# Patient Record
Sex: MALE | Race: WHITE | NOT HISPANIC OR LATINO | Employment: UNEMPLOYED | URBAN - METROPOLITAN AREA
[De-identification: names, ages, dates, MRNs, and addresses within clinical notes are randomized per-mention and may not be internally consistent; named-entity substitution may affect disease eponyms.]

---

## 2019-08-13 ENCOUNTER — HISTORICAL (OUTPATIENT)
Dept: ADMINISTRATIVE | Facility: HOSPITAL | Age: 59
End: 2019-08-13

## 2019-08-16 LAB — FINAL CULTURE: NORMAL

## 2021-08-09 ENCOUNTER — HISTORICAL (OUTPATIENT)
Dept: ADMINISTRATIVE | Facility: HOSPITAL | Age: 61
End: 2021-08-09

## 2021-11-04 ENCOUNTER — HISTORICAL (OUTPATIENT)
Dept: SURGERY | Facility: HOSPITAL | Age: 61
End: 2021-11-04

## 2022-04-10 ENCOUNTER — HISTORICAL (OUTPATIENT)
Dept: ADMINISTRATIVE | Facility: HOSPITAL | Age: 62
End: 2022-04-10
Payer: COMMERCIAL

## 2022-04-27 VITALS
BODY MASS INDEX: 29.37 KG/M2 | DIASTOLIC BLOOD PRESSURE: 76 MMHG | SYSTOLIC BLOOD PRESSURE: 133 MMHG | HEIGHT: 68 IN | WEIGHT: 193.81 LBS

## 2022-04-30 NOTE — OP NOTE
Patient:   Sang Magallanes            MRN: [id number]            FIN: [id number]               Age:   61 years     Sex:  Male     :  1960   Associated Diagnoses:   None   Author:   Mark De Los Santos MD, Cosmo SCOTT      SURGEON: Cosmo Flores MD    PREOPERATIVE DIAGNOSIS: Right shoulder rotator cuff tear, biceps tendinitis  POSTOPERATIVE DIAGNOSIS: Right shoulder rotator cuff tear, biceps tendinitis    PROCEDURE PERFORMED:   1. Right shoulder arthroscopic rotator cuff repair (subscapularis)  2. Right shoulder arthroscopic biceps tenodesis    ASSISTANT: Keisha Dietrich NP. Mrs. Dietrich was essential in manipulating the arm I performed the procedure, suture shuttling and management, and closure    ANESTHESIA: General plus regional    ESTIMATED BLOOD LOSS: Minimal.    COMPLICATIONS: None.    IMPLANTS:    1. Medial Row: Arthrex SwiveLock 4.75mm Anchors   2. Biceps: Arthrex SwiveLock 4.75mm Anchors    INDICATIONS FOR PROCEDURE: Sang is a 61y.o. male who has had ongoing right shoulder pain and weakness. The patient was seen in the clinic and preoperative imaging has revealed a torn rotator cuff. The patient has failed nonoperative treatment and has elected for operative intervention. Risks and benefits were thoroughly explained and consent was obtained prior to today's procedure.    DESCRIPTION OF PROCEDURE: The patient was seen in the preoperative holding area where the history and physical were reviewed without change. The operative shoulder was marked, consents were reviewed, and any questions were answered for the patient. A regional blockade of the shoulder was performed by the Anesthesia Service. The patient was induced under general anesthesia. Next the patient was placed upright into the beachchair position with care taken to ensure the cervical spine was well positioned and the face, eyes and all bony prominences well protected. Preoperative time-out led by the surgeon was performed verifying the patient, procedure,  and preoperative antibiotics. The right upper extremity was then prepped and draped in the usual sterile fashion and secured to an arm hernandes.    A standard posterior portal was established with a #11-blade.  The arthroscope was inserted into the glenohumeral joint atraumatically. The joint was insufflated with arthroscopic fluid. We then made a standard anterior portal using an #18-gauge spinal needle for guidance. An arthroscopic probe was inserted through the anterior portal and diagnostic arthroscopy begun.    This revealed a inflamed biceps tendon. A intact superior labrum. An degenerative anterior, inferior and posterior labrum. The articular cartilage of the humeral head was intact. The articular cartilage of the glenoid was intact. The subscapularis tendon was torn off the superior edge. The articular side of the supraspinatus tendon was partially torn, less than 50%.  This was debrided with a shaver. The articular side of the infraspinatus tendon was intact.    I then used the shaver in order to debride the lesser tuberosity.  I then passed a stitch through the superior edge of the subscapularis and reduced it down to the tuberosity using a fiber tape and swivel lock anchor.  The fiber tape was cut flush.  I then used the retained sutures and passed these through the biceps tendon in a horizontal mattress fashion.  I then tied the biceps tendon down high in the groove after releasing it from its insertion on the superior labrum.    We identified the undersurface of the acromion. We used a VAPR to debride the undersurface of the acromion up to the anterior and lateral margins. We identified the CA ligament and reflected it off the acromion. We continued debridement of the bursal tissue, which was abundant.  I then inspected the bursal side of the rotator cuff and it was intact.    Once we completed this, we took the remaining final arthroscopic pictures. We then removed our instruments, closed the portals  with #3-0 monocryl suture. Sterile dressings were applied. The patient was then placed in an abduction pillow and sling, awoken from general anesthetic, and taken to recovery room in a stable condition.

## 2022-06-24 ENCOUNTER — OFFICE VISIT (OUTPATIENT)
Dept: ORTHOPEDICS | Facility: CLINIC | Age: 62
End: 2022-06-24
Payer: COMMERCIAL

## 2022-06-24 VITALS
BODY MASS INDEX: 29.55 KG/M2 | HEIGHT: 68 IN | SYSTOLIC BLOOD PRESSURE: 164 MMHG | HEART RATE: 63 BPM | DIASTOLIC BLOOD PRESSURE: 87 MMHG | WEIGHT: 195 LBS

## 2022-06-24 DIAGNOSIS — M65.312 TRIGGER FINGER OF LEFT THUMB: Primary | ICD-10-CM

## 2022-06-24 PROCEDURE — 1159F PR MEDICATION LIST DOCUMENTED IN MEDICAL RECORD: ICD-10-PCS | Mod: CPTII,,, | Performed by: ORTHOPAEDIC SURGERY

## 2022-06-24 PROCEDURE — 3077F SYST BP >= 140 MM HG: CPT | Mod: CPTII,,, | Performed by: ORTHOPAEDIC SURGERY

## 2022-06-24 PROCEDURE — 3008F BODY MASS INDEX DOCD: CPT | Mod: CPTII,,, | Performed by: ORTHOPAEDIC SURGERY

## 2022-06-24 PROCEDURE — 3008F PR BODY MASS INDEX (BMI) DOCUMENTED: ICD-10-PCS | Mod: CPTII,,, | Performed by: ORTHOPAEDIC SURGERY

## 2022-06-24 PROCEDURE — 3079F DIAST BP 80-89 MM HG: CPT | Mod: CPTII,,, | Performed by: ORTHOPAEDIC SURGERY

## 2022-06-24 PROCEDURE — 99213 OFFICE O/P EST LOW 20 MIN: CPT | Mod: ,,, | Performed by: ORTHOPAEDIC SURGERY

## 2022-06-24 PROCEDURE — 99213 PR OFFICE/OUTPT VISIT, EST, LEVL III, 20-29 MIN: ICD-10-PCS | Mod: ,,, | Performed by: ORTHOPAEDIC SURGERY

## 2022-06-24 PROCEDURE — 1159F MED LIST DOCD IN RCRD: CPT | Mod: CPTII,,, | Performed by: ORTHOPAEDIC SURGERY

## 2022-06-24 PROCEDURE — 3079F PR MOST RECENT DIASTOLIC BLOOD PRESSURE 80-89 MM HG: ICD-10-PCS | Mod: CPTII,,, | Performed by: ORTHOPAEDIC SURGERY

## 2022-06-24 PROCEDURE — 3077F PR MOST RECENT SYSTOLIC BLOOD PRESSURE >= 140 MM HG: ICD-10-PCS | Mod: CPTII,,, | Performed by: ORTHOPAEDIC SURGERY

## 2022-06-24 RX ORDER — CEFAZOLIN SODIUM 2 G/50ML
2 SOLUTION INTRAVENOUS
Status: CANCELLED | OUTPATIENT
Start: 2022-07-14

## 2022-06-24 RX ORDER — SODIUM CHLORIDE 9 MG/ML
INJECTION, SOLUTION INTRAVENOUS CONTINUOUS
Status: CANCELLED | OUTPATIENT
Start: 2022-07-14

## 2022-06-24 NOTE — H&P (VIEW-ONLY)
"Chief Complaint:   Chief Complaint   Patient presents with    Left Hand - Pain    Pain     Left thumb pain, had injection on 2/2 states it only helped for a month       Consulting Physician: No ref. provider found    History of present illness:    he  is a pleasant 61 y.o. year old male with left thumb pain since the Fall 2021 after hitting his thumb on a board. He has noted some continued triggering since then.  We tried an injection in February of 2022. The injection worked for about a month.  Unfortunately his triggering and pain have returned.      History reviewed. No pertinent past medical history.    Past Surgical History:   Procedure Laterality Date    FACIAL FRACTURE SURGERY      PROSTATE SURGERY      SHOULDER SURGERY      Right       No current outpatient medications on file.     No current facility-administered medications for this visit.       Review of patient's allergies indicates:   Allergen Reactions    Naproxen Rash       History reviewed. No pertinent family history.    Social History     Socioeconomic History    Marital status:    Tobacco Use    Smoking status: Never Smoker    Smokeless tobacco: Never Used       Review of Systems:    Constitution:   Denies chills, fever, and sweats.  HENT:   Denies headaches or blurry vision.  Cardiovascular:  Denies chest pain or irregular heart beat.  Respiratory:   Denies cough or shortness of breath.  Gastrointestinal:  Denies abdominal pain, nausea, or vomiting.  Musculoskeletal:   Denies muscle cramps.  Neurological:   Denies dizziness or focal weakness.  Psychiatric/Behavior: Normal mental status.  Hematology/Lymph:  Denies bleeding problem or easy bruising/bleeding.  Skin:    Denies rash or suspicious lesions.    Examination:    Vital Signs:    Vitals:    06/24/22 1023   BP: (!) 164/87   Pulse: 63   Weight: 88.5 kg (195 lb)   Height: 5' 8" (1.727 m)       Body mass index is 29.65 kg/m².    Constitution:   Well-developed, well nourished " patient in no acute distress.  Neurological:   Alert and oriented x 3 and cooperative to examination.     Psychiatric/Behavior: Normal mental status.  Respiratory:   No shortness of breath.  Eyes:    Extraoccular muscles intact  Skin:    No scars, rash or suspicious lesions.    MSK:   Exam over the thumb shows tenderness over his oblique pulley.  He has active triggering with flexion.           Assessment: Trigger finger of left thumb        Plan:  I have recommended surgical intervention:  Left thumb trigger release.  We discussed the details of the procedure and expected postoperative course.  We discussed the benefits of surgery which he did decrease his pain and increase function.  We discussed the risks of surgery which is small but could be significant if his continued pain, stiffness, or infection.  After discussion I to proceed.  Plans for surgery July 14.

## 2022-07-11 RX ORDER — ROSUVASTATIN CALCIUM 10 MG/1
10 TABLET, COATED ORAL DAILY
COMMUNITY
Start: 2022-03-11 | End: 2024-02-29

## 2022-07-11 RX ORDER — IBUPROFEN 200 MG
200 TABLET ORAL EVERY 6 HOURS PRN
COMMUNITY
End: 2024-02-29

## 2022-07-13 ENCOUNTER — ANESTHESIA EVENT (OUTPATIENT)
Dept: SURGERY | Facility: HOSPITAL | Age: 62
End: 2022-07-13
Payer: COMMERCIAL

## 2022-07-13 NOTE — ANESTHESIA PREPROCEDURE EVALUATION
07/13/2022  Sang Magallanes is a 61 y.o., male presents with a locking Thumb on his left hand due to:.  Diagnosis:   Trigger finger of left thumb        He comes to [facility] for the noted procedure under IV Sedation with Local  Per Surgeon.  Procedure:   RELEASE, TRIGGER FINGER (Left Hand)      PMHx:  Arthritis Hypercholesteremia       Surgical History  SHOULDER SURGERY FACIAL FRACTURE SURGERY   PROSTATE SURGERY            Vital signs:        Pre-op Assessment    I have reviewed the Patient Summary Reports.     I have reviewed the Nursing Notes. I have reviewed the NPO Status.   I have reviewed the Medications.     Review of Systems  Anesthesia Hx:  No problems with previous Anesthesia    Social:  Non-Smoker    Hematology/Oncology:  Hematology Normal   Oncology Normal     EENT/Dental:EENT/Dental Normal   Cardiovascular:  Cardiovascular Normal Exercise tolerance: good   Functional Capacity good / => 4 METS    Pulmonary:  Pulmonary Normal    Renal/:  Renal/ Normal     Hepatic/GI:  Hepatic/GI Normal    Musculoskeletal:  Musculoskeletal Normal    Neurological:  Neurology Normal    Endocrine:  Endocrine Normal    Dermatological:  Skin Normal    Psych:  Psychiatric Normal           Physical Exam  General: Alert, Oriented, Well nourished and Cooperative    Airway:  Mallampati: II   Mouth Opening: Normal  TM Distance: Normal  Tongue: Normal  Neck ROM: Normal ROM    Dental:  Intact    Chest/Lungs:  Clear to auscultation, Normal Respiratory Rate    Heart:  Rate: Normal  Rhythm: Regular Rhythm        Anesthesia Plan  Type of Anesthesia, risks & benefits discussed:    Anesthesia Type: MAC, Gen Natural Airway  Intra-op Monitoring Plan: Standard ASA Monitors  Post Op Pain Control Plan: IV/PO Opioids PRN  Induction:  IV  ASA Score: 1  Day of Surgery Review of History & Physical: H&P Update referred to the  surgeon/provider.    Ready For Surgery From Anesthesia Perspective.     .

## 2022-07-14 ENCOUNTER — HOSPITAL ENCOUNTER (OUTPATIENT)
Facility: HOSPITAL | Age: 62
Discharge: HOME | End: 2022-07-14
Attending: ORTHOPAEDIC SURGERY | Admitting: ORTHOPAEDIC SURGERY
Payer: COMMERCIAL

## 2022-07-14 ENCOUNTER — ANESTHESIA (OUTPATIENT)
Dept: SURGERY | Facility: HOSPITAL | Age: 62
End: 2022-07-14
Payer: COMMERCIAL

## 2022-07-14 DIAGNOSIS — M65.312 TRIGGER FINGER OF LEFT THUMB: Primary | ICD-10-CM

## 2022-07-14 PROCEDURE — 25000003 PHARM REV CODE 250: Performed by: ORTHOPAEDIC SURGERY

## 2022-07-14 PROCEDURE — 25000003 PHARM REV CODE 250: Performed by: NURSE ANESTHETIST, CERTIFIED REGISTERED

## 2022-07-14 PROCEDURE — 71000015 HC POSTOP RECOV 1ST HR: Performed by: ORTHOPAEDIC SURGERY

## 2022-07-14 PROCEDURE — 71000016 HC POSTOP RECOV ADDL HR: Performed by: ORTHOPAEDIC SURGERY

## 2022-07-14 PROCEDURE — 37000008 HC ANESTHESIA 1ST 15 MINUTES: Performed by: ORTHOPAEDIC SURGERY

## 2022-07-14 PROCEDURE — 63600175 PHARM REV CODE 636 W HCPCS: Performed by: NURSE ANESTHETIST, CERTIFIED REGISTERED

## 2022-07-14 PROCEDURE — 25000003 PHARM REV CODE 250: Performed by: ANESTHESIOLOGY

## 2022-07-14 PROCEDURE — 26055 PR INCISE FINGER TENDON SHEATH: ICD-10-PCS | Mod: FA,,, | Performed by: ORTHOPAEDIC SURGERY

## 2022-07-14 PROCEDURE — 36000707: Performed by: ORTHOPAEDIC SURGERY

## 2022-07-14 PROCEDURE — 26055 INCISE FINGER TENDON SHEATH: CPT | Mod: FA,,, | Performed by: ORTHOPAEDIC SURGERY

## 2022-07-14 PROCEDURE — 37000009 HC ANESTHESIA EA ADD 15 MINS: Performed by: ORTHOPAEDIC SURGERY

## 2022-07-14 PROCEDURE — 36000706: Performed by: ORTHOPAEDIC SURGERY

## 2022-07-14 RX ORDER — LIDOCAINE HYDROCHLORIDE 20 MG/ML
INJECTION, SOLUTION EPIDURAL; INFILTRATION; INTRACAUDAL; PERINEURAL
Status: DISCONTINUED | OUTPATIENT
Start: 2022-07-14 | End: 2022-07-14

## 2022-07-14 RX ORDER — CEFAZOLIN SODIUM 2 G/50ML
2 SOLUTION INTRAVENOUS
Status: DISCONTINUED | OUTPATIENT
Start: 2022-07-14 | End: 2022-07-14 | Stop reason: HOSPADM

## 2022-07-14 RX ORDER — TRAMADOL HYDROCHLORIDE 50 MG/1
50 TABLET ORAL EVERY 4 HOURS PRN
Status: DISCONTINUED | OUTPATIENT
Start: 2022-07-14 | End: 2022-07-14 | Stop reason: HOSPADM

## 2022-07-14 RX ORDER — MIDAZOLAM HYDROCHLORIDE 1 MG/ML
INJECTION INTRAMUSCULAR; INTRAVENOUS
Status: DISCONTINUED | OUTPATIENT
Start: 2022-07-14 | End: 2022-07-14

## 2022-07-14 RX ORDER — PROPOFOL 10 MG/ML
VIAL (ML) INTRAVENOUS CONTINUOUS PRN
Status: DISCONTINUED | OUTPATIENT
Start: 2022-07-14 | End: 2022-07-14

## 2022-07-14 RX ORDER — GLYCOPYRROLATE 0.2 MG/ML
INJECTION INTRAMUSCULAR; INTRAVENOUS
Status: DISCONTINUED | OUTPATIENT
Start: 2022-07-14 | End: 2022-07-14

## 2022-07-14 RX ORDER — LIDOCAINE HYDROCHLORIDE 10 MG/ML
INJECTION INFILTRATION; PERINEURAL
Status: DISCONTINUED
Start: 2022-07-14 | End: 2022-07-14 | Stop reason: HOSPADM

## 2022-07-14 RX ORDER — MORPHINE SULFATE 4 MG/ML
3 INJECTION, SOLUTION INTRAMUSCULAR; INTRAVENOUS
Status: DISCONTINUED | OUTPATIENT
Start: 2022-07-14 | End: 2022-07-14 | Stop reason: HOSPADM

## 2022-07-14 RX ORDER — DEXAMETHASONE SODIUM PHOSPHATE 4 MG/ML
INJECTION, SOLUTION INTRA-ARTICULAR; INTRALESIONAL; INTRAMUSCULAR; INTRAVENOUS; SOFT TISSUE
Status: DISCONTINUED | OUTPATIENT
Start: 2022-07-14 | End: 2022-07-14

## 2022-07-14 RX ORDER — LIDOCAINE HYDROCHLORIDE 10 MG/ML
1 INJECTION, SOLUTION EPIDURAL; INFILTRATION; INTRACAUDAL; PERINEURAL ONCE
Status: DISCONTINUED | OUTPATIENT
Start: 2022-07-14 | End: 2022-07-14 | Stop reason: HOSPADM

## 2022-07-14 RX ORDER — CEFAZOLIN SODIUM 1 G/3ML
INJECTION, POWDER, FOR SOLUTION INTRAMUSCULAR; INTRAVENOUS
Status: DISCONTINUED | OUTPATIENT
Start: 2022-07-14 | End: 2022-07-14

## 2022-07-14 RX ORDER — ONDANSETRON 2 MG/ML
4 INJECTION INTRAMUSCULAR; INTRAVENOUS EVERY 12 HOURS PRN
Status: DISCONTINUED | OUTPATIENT
Start: 2022-07-14 | End: 2022-07-14 | Stop reason: HOSPADM

## 2022-07-14 RX ORDER — ONDANSETRON 2 MG/ML
INJECTION INTRAMUSCULAR; INTRAVENOUS
Status: DISCONTINUED | OUTPATIENT
Start: 2022-07-14 | End: 2022-07-14

## 2022-07-14 RX ORDER — SODIUM CHLORIDE 0.9 % (FLUSH) 0.9 %
3 SYRINGE (ML) INJECTION EVERY 6 HOURS PRN
Status: DISCONTINUED | OUTPATIENT
Start: 2022-07-14 | End: 2022-07-14 | Stop reason: HOSPADM

## 2022-07-14 RX ORDER — SODIUM CHLORIDE, SODIUM GLUCONATE, SODIUM ACETATE, POTASSIUM CHLORIDE AND MAGNESIUM CHLORIDE 30; 37; 368; 526; 502 MG/100ML; MG/100ML; MG/100ML; MG/100ML; MG/100ML
1000 INJECTION, SOLUTION INTRAVENOUS CONTINUOUS
Status: DISCONTINUED | OUTPATIENT
Start: 2022-07-14 | End: 2022-07-14 | Stop reason: HOSPADM

## 2022-07-14 RX ORDER — LIDOCAINE HYDROCHLORIDE 10 MG/ML
INJECTION INFILTRATION; PERINEURAL
Status: DISCONTINUED | OUTPATIENT
Start: 2022-07-14 | End: 2022-07-14 | Stop reason: HOSPADM

## 2022-07-14 RX ORDER — SODIUM CHLORIDE 9 MG/ML
INJECTION, SOLUTION INTRAVENOUS CONTINUOUS
Status: DISCONTINUED | OUTPATIENT
Start: 2022-07-14 | End: 2022-07-14 | Stop reason: HOSPADM

## 2022-07-14 RX ORDER — HYDROCODONE BITARTRATE AND ACETAMINOPHEN 5; 325 MG/1; MG/1
1 TABLET ORAL EVERY 4 HOURS PRN
Status: DISCONTINUED | OUTPATIENT
Start: 2022-07-14 | End: 2022-07-14 | Stop reason: HOSPADM

## 2022-07-14 RX ORDER — FENTANYL CITRATE 50 UG/ML
INJECTION, SOLUTION INTRAMUSCULAR; INTRAVENOUS
Status: DISCONTINUED | OUTPATIENT
Start: 2022-07-14 | End: 2022-07-14

## 2022-07-14 RX ADMIN — DEXAMETHASONE SODIUM PHOSPHATE 4 MG: 4 INJECTION, SOLUTION INTRA-ARTICULAR; INTRALESIONAL; INTRAMUSCULAR; INTRAVENOUS; SOFT TISSUE at 07:07

## 2022-07-14 RX ADMIN — MIDAZOLAM 2 MG: 1 INJECTION INTRAMUSCULAR; INTRAVENOUS at 06:07

## 2022-07-14 RX ADMIN — SODIUM CHLORIDE, SODIUM GLUCONATE, SODIUM ACETATE, POTASSIUM CHLORIDE AND MAGNESIUM CHLORIDE: 526; 502; 368; 37; 30 INJECTION, SOLUTION INTRAVENOUS at 06:07

## 2022-07-14 RX ADMIN — FENTANYL CITRATE 100 MCG: 50 INJECTION, SOLUTION INTRAMUSCULAR; INTRAVENOUS at 06:07

## 2022-07-14 RX ADMIN — ONDANSETRON HYDROCHLORIDE 4 MG: 2 SOLUTION INTRAMUSCULAR; INTRAVENOUS at 07:07

## 2022-07-14 RX ADMIN — LIDOCAINE HYDROCHLORIDE 50 MG: 20 INJECTION, SOLUTION EPIDURAL; INFILTRATION; INTRACAUDAL; PERINEURAL at 06:07

## 2022-07-14 RX ADMIN — PROPOFOL 65 MCG/KG/MIN: 10 INJECTION, EMULSION INTRAVENOUS at 06:07

## 2022-07-14 RX ADMIN — GLYCOPYRROLATE 0.1 MG: 0.2 INJECTION INTRAMUSCULAR; INTRAVENOUS at 06:07

## 2022-07-14 RX ADMIN — SODIUM CHLORIDE, SODIUM GLUCONATE, SODIUM ACETATE, POTASSIUM CHLORIDE AND MAGNESIUM CHLORIDE 1000 ML: 526; 502; 368; 37; 30 INJECTION, SOLUTION INTRAVENOUS at 06:07

## 2022-07-14 RX ADMIN — CEFAZOLIN 2 G: 330 INJECTION, POWDER, FOR SOLUTION INTRAMUSCULAR; INTRAVENOUS at 06:07

## 2022-07-14 NOTE — TRANSFER OF CARE
"Anesthesia Transfer of Care Note    Patient: Sang Magallanes    Procedure(s) Performed: Procedure(s) (LRB):  RELEASE, TRIGGER FINGER (Left)    Patient location: OPS    Anesthesia Type: MAC    Transport from OR: Transported from OR on room air with adequate spontaneous ventilation    Post pain: adequate analgesia    Post assessment: no apparent anesthetic complications    Post vital signs: stable    Level of consciousness: awake    Nausea/Vomiting: no nausea/vomiting    Complications: none    Transfer of care protocol was followed      Last vitals:   Visit Vitals  /81   Pulse (!) 51   Temp 35.6 °C (96.1 °F) (Tympanic)   Resp 16   Ht 5' 8" (1.727 m)   Wt 88.9 kg (195 lb 15.8 oz)   SpO2 97%   BMI 29.80 kg/m²     "

## 2022-07-14 NOTE — OP NOTE
Date of Service: 07/14/2022    Preoperative diagnosis: Left trigger thumb    Postoperative diagnosis: Left trigger thumb    Attending surgeon: Cosmo Flores MD    Assistant: Keisha Dietrich, nurse practitioner.  Ms. Dietrich was essential manipulating the thumb, retraction of the nerve, and closure    Procedure: Left Trigger thumb release     Anesthesia: Local plus monitored care    Estimated blood loss: Minimal    Tourniquet time:  15 Minutes    Complications: None    History of present illness: Sang Magallaens is a pleasant 61 y.o.-year-old who has had persisted pain and triggering of the thumb.  Subjective and objective signs of trigger thumb were observed.  After failing conservative measures, I recommended release.  The risks, benefits, and alternatives to therapy were presented.  The patient elects to proceed    Procedure: Sang was initially seen in the preoperative unit where a history and physical were reviewed without change.  The operative extremity was marked.  Consents were reviewed.  All questions were answered.  The patient was then taken to the operating room placed supine on the operative table where monitored care was undertaken.  I injected local anesthesia around the incision.  Perioperative antibiotics were administered.  The operative extremity was prepped and draped in sterile fashion.  An attending lead timeout confirmed the operative side; I then began the procedure.    I began my incision at the proximal thumb crease over the oblique pulley.  I sharply incised through skin and spread through subcutaneous tissue.  I identified the superficial nerve and protected it through the procedure.  I then identified the oblique pulley and incised in line with the tendon.  The tendon was inspected and intact.  They were no masses or lesions.  I irrigated out the incision.  I closed the incision with nylon sutures.  The patient was placed into a sterile dressing.  The patient was awoken from anesthesia and  transferred to the postop unit good condition.

## 2022-07-14 NOTE — PATIENT INSTRUCTIONS
[facility]   Cosmo Flores Jr., MD   [address], [address]   [place] [address]   [phone], fax: [phone]     TRIGGER FINGER    DIET:  Following general anesthesia, start with clear liquids to decrease chances of nausea.  Begin with water, coffee, tea, ginger ale, sprite, or apple juice.  If tolerated, advance to Jell-o, soup, crackers, or toast.  Once these are tolerated, advance to a regular diet.    DRESSING:  Keep the dressing clean and dry.  Remove the dressing on the 3rd day after surgery and replace the gauze with bandaids.      SHOWERING:  You may shower 5 days after surgery.  Remove the dressing or band-aids before showering.  Leave the incisions open to air after showering.  You can cover the sutures with band-aids if clothing irritates the stitches.      ACTIVITY:            -  Ice should be applied to the hand for 30 minutes, 5-6 times per day, for the 1st week to help decrease pain and swelling.      PAIN MEDICATION:       -  Use the Percocet as prescribed for pain after surgery.  Pain medicine can cause nausea and vomiting, especially on an empty stomach        -  In addition, you can take Ketorolac as prescribed or Iburpofen 200 mg, 4 pills every 8 hours.  Ketorolac or Iburpofen medicine can irritate your stomach or cause heartburn.  If                             this happens to you, stop taking the medicine.       -  Ice and elevation are more useful than pain medicine for surgical pain.  If you are having too much pain or discomfort, try more ice and higher elevation.       -  Do NOT drive a vehicle or use heavy machinery while taking pain medication.       -  Do NOT drink alcohol while taking pain medication.    BLOOD CLOT PREVENTION:  If you are aware that you are at high risk for blood blots, notify your physician.  In general, you should walk as much as possible after surgery to increase blood circulation throughout your body.      DRIVING OR FLYING:  You must NEVER drive while taking narcotic pain medication  You may ride in a car, take a train, or fly once you feel comfortable    PHYSICAL THERAPY:  Most patients will not need physical therapy.    WORK OR SCHOOL:  You may return to an office job or school whenver comfortable.  Most patients return ~ 1 week after surgery.  For more active jobs that require extended walking, squatting, or lifting, you can wait until after your follow up appointment.  Any other types of jobs should be discussed with Dr. Flores to determine a date for return to work.    PROBLEMS TO REPORT:       1.  Fever greater than 102 F       2.  Incision that is very red and/or draining pus       3.  Unable to urinate within 8 hours of surgery (a rare effect of being put to sleep for surgery)  Calls should be directed to the clinic:  [phone]    RETURN APPOINTMENT:  To confirm or reschedule your appointment, call [phone]

## 2022-07-15 VITALS
BODY MASS INDEX: 29.7 KG/M2 | HEART RATE: 46 BPM | DIASTOLIC BLOOD PRESSURE: 67 MMHG | HEIGHT: 68 IN | OXYGEN SATURATION: 93 % | SYSTOLIC BLOOD PRESSURE: 102 MMHG | RESPIRATION RATE: 20 BRPM | WEIGHT: 196 LBS | TEMPERATURE: 96 F

## 2022-07-18 NOTE — ANESTHESIA POSTPROCEDURE EVALUATION
Anesthesia Post Evaluation    Patient: Sang Magallanes    Procedure(s) Performed: Procedure(s) (LRB):  RELEASE, TRIGGER FINGER (Left)    Final Anesthesia Type: MAC      Patient location during evaluation: OPS  Patient participation: Yes- Able to Participate  Level of consciousness: awake and alert and oriented  Post-procedure vital signs: reviewed and stable  Pain management: adequate  Airway patency: patent    PONV status at discharge: No PONV, vomiting (controlled) and nausea (controlled)  Anesthetic complications: no      Cardiovascular status: stable and blood pressure returned to baseline  Respiratory status: unassisted  Hydration status: euvolemic            Vitals Value Taken Time   /67 07/14/22 0801   Temp 37 07/18/22 0744   Pulse 48 07/14/22 0809   Resp 20 07/14/22 0800   SpO2 96 % 07/14/22 0809   Vitals shown include unvalidated device data.      No case tracking events are documented in the log.      Pain/Louis Score: No data recorded

## 2022-07-25 ENCOUNTER — OFFICE VISIT (OUTPATIENT)
Dept: ORTHOPEDICS | Facility: CLINIC | Age: 62
End: 2022-07-25
Payer: COMMERCIAL

## 2022-07-25 VITALS
BODY MASS INDEX: 29.55 KG/M2 | DIASTOLIC BLOOD PRESSURE: 67 MMHG | HEIGHT: 68 IN | WEIGHT: 195 LBS | HEART RATE: 46 BPM | SYSTOLIC BLOOD PRESSURE: 102 MMHG

## 2022-07-25 DIAGNOSIS — Z98.890 S/P TRIGGER FINGER RELEASE: Primary | ICD-10-CM

## 2022-07-25 PROCEDURE — 99024 PR POST-OP FOLLOW-UP VISIT: ICD-10-PCS | Mod: ,,, | Performed by: NURSE PRACTITIONER

## 2022-07-25 PROCEDURE — 3078F PR MOST RECENT DIASTOLIC BLOOD PRESSURE < 80 MM HG: ICD-10-PCS | Mod: CPTII,,, | Performed by: NURSE PRACTITIONER

## 2022-07-25 PROCEDURE — 3008F BODY MASS INDEX DOCD: CPT | Mod: CPTII,,, | Performed by: NURSE PRACTITIONER

## 2022-07-25 PROCEDURE — 3078F DIAST BP <80 MM HG: CPT | Mod: CPTII,,, | Performed by: NURSE PRACTITIONER

## 2022-07-25 PROCEDURE — 3074F PR MOST RECENT SYSTOLIC BLOOD PRESSURE < 130 MM HG: ICD-10-PCS | Mod: CPTII,,, | Performed by: NURSE PRACTITIONER

## 2022-07-25 PROCEDURE — 99024 POSTOP FOLLOW-UP VISIT: CPT | Mod: ,,, | Performed by: NURSE PRACTITIONER

## 2022-07-25 PROCEDURE — 1159F MED LIST DOCD IN RCRD: CPT | Mod: CPTII,,, | Performed by: NURSE PRACTITIONER

## 2022-07-25 PROCEDURE — 1159F PR MEDICATION LIST DOCUMENTED IN MEDICAL RECORD: ICD-10-PCS | Mod: CPTII,,, | Performed by: NURSE PRACTITIONER

## 2022-07-25 PROCEDURE — 3008F PR BODY MASS INDEX (BMI) DOCUMENTED: ICD-10-PCS | Mod: CPTII,,, | Performed by: NURSE PRACTITIONER

## 2022-07-25 PROCEDURE — 3074F SYST BP LT 130 MM HG: CPT | Mod: CPTII,,, | Performed by: NURSE PRACTITIONER

## 2022-07-25 NOTE — PROGRESS NOTES
Chief Complaint:   Chief Complaint   Patient presents with    Left Hand - Post-op Evaluation    Post-op Evaluation     2wk S/P Left trigger finger sx 7/14/22 GL 10/12/22, patient presents today wihtout a dressing or bandaid covering the sutures stated hes been working cutting grass doing yard work and cleaning fish and making sausages, having some pain today and tenderness in the thumb       History of present illness:  7/14/22: Left Trigger thumb release    He returns today. He denies triggering in his thumb. He reports incisional pain. Denies erythema or drainage.     Musculoskeletal:   Left thumb incision healing. Without erythema, drainage, or signs of infection. + swelling around the incision.       Assessment: S/P trigger finger release        Plan:  Doing well s/p above. Sutures out today. He can follow up if he has any issues.

## 2022-11-10 NOTE — HISTORICAL [facility]
This is a historical note converted from Antonino. Formatting and pictures may have been removed.  Please reference Antonino for original formatting and attached multimedia. Chief Complaint  New patient here with Right shoulder pain since 2019. No injury. Had US in past was told has partial tear. Had injections in past with minimal relief. Xrays today.  History of Present Illness  He is a pleasant 60-year-old left-hand-dominant male who has had right shoulder pain since 2019.? The pains located laterally and also anterior over the shoulder. ?He notes it worse with motion specifically abduction.? It somewhat better at rest.? He is had a steroid injections in the past with a temporary relief.? He is tried home exercise program for greater than 6 weeks?without relief. ?He has had an ultrasound?in December 2019 which showed a partial-thickness rotator cuff tear.??He is unable to undergo MRI.? 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 BP:?118/76?  HT:?174.00?cm? WT:?86.500?kg? BMI:?28.57?  Gen: WN, WD, NAD  Card/Res: NL breathing, +distal pulses  Abdomen: ND  Shoulder Exam:??????????Right??????????Left  Skin:??????????????????????????????Normal???????Normal  AC joint tenderness:??????????None??????????None  Forward Flexion:?????????????120 ??????????180  Abduction:?????????????????????100??????????? 180  External Rotation: ????????????? 80??????????????80  Internal Rotation?????????????? 80 ???????????? 80  Supraspinatus stress test?????? Neg??????????Neg  Porter Impingement:????????+?????????????Neg  Neer Impingement:?????????????+??????????Neg  Apprehension:???????????????????? Neg??????????Neg  OBriens:????????????????????????????+????????? Neg  Speeds test:???????????????????????  Neg??????????Neg  Strength:  External Rotation:???????????????5/5???????????????5/5  Lift Off/belly press:????????????5/5???????????????5/5  ?   N-V status:?????????????????????????Intact??????????Intact  ?   C-spine: Normal ROM, NT  ?  ?  Assessment/Plan  1.?Rotator cuff tear?M75.100  ?We discussed treatment options going forward. ?He would like to pursue surgical intervention. ?I recommended right shoulder arthroscopic rotator cuff repair, biceps tenodesis.? We discussed the details of the procedure and the expected postoperative course.? We discussed the benefits of surgery which would be to decrease his pain and increase his function.? We discussed the risk of surgery?which is small but could be significant if he has pain, stiffness, or infection.? After discussion he like to proceed. ?I will see him back in late October?with plans for surgical intervention in November  Ordered:  Clinic Follow up, *Est. 10/09/21 3:00:00 CDT, Order for future visit, Rotator cuff tear, [facility]  Office/Outpatient Visit Level 3 New [id number] PC, Rotator cuff tear, [facility], 08/09/21 14:57:00 CDT  ?  Orders:  XR Shoulder Right Minimum 2 Views, Routine, 08/09/21 14:31:00 CDT, None, Ambulatory, Rad Type, Right shoulder pain, Not Scheduled, 08/09/21 14:31:00 CDT  Referrals  Clinic Follow up, *Est. 10/09/21 3:00:00 CDT, Order for future visit, Rotator cuff tear, [facility]   Problem List/Past Medical History  Ongoing  High cholesterol  Prostate cancer  Historical  No qualifying data  Procedure/Surgical History  Prostate Surgery (08/01/2019)  Colonoscopy (01/01/2013)   Medications  Carafate 1 g oral tablet, 1 gm= 1 tab(s), Oral, QID,? ?Not taking  Decadron 4 mg oral tablet, 4 mg= 1 tab(s), Oral, BID,? ?Not taking: Last Dose Date/Time unknown  doxycycline hyclate 100 mg oral tablet, 100 mg= 1 tab(s), Oral, BID,? ?Not taking: Last Dose Date/Time unknown  NICU Pulmicort 0.5 mg/2 mL inhalation suspension, 0.5 mg= 2 mL,  NEB, BID,? ?Not taking: Last Dose Date/Time unknown  rosuvastatin 10 mg oral tablet, 10 mg= 1 tab(s), Oral, qPM  Allergies  naproxen?(Rash)  Social History  Abuse/Neglect  No, No, Yes, 08/09/2021  Alcohol  Current, Beer, 1-2 times per week, 01/11/2021  Employment/School  Employed, Work/School description: . Highest education level: High school., 01/11/2021  Spiritual/Cultural  [employer], 01/11/2021  Tobacco - Denies Tobacco Use, 06/23/2013  Never (less than 100 in lifetime), No, 08/09/2021  Family History  Congestive heart disease.: Mother and Sister.  Diabetes mellitus type 1.: Father.  Stroke: Father and Sister.  Health Maintenance  Health Maintenance  ???Pending?(in the next year)  ??? ??OverDue  ??? ? ? ?Aspirin Therapy for CVD Prevention due??06/23/14??and every 1??year(s)  ??? ??Due?  ??? ? ? ?ADL Screening due??08/09/21??and every 1??year(s)  ??? ? ? ?Tetanus Vaccine due??08/09/21??and every 10??year(s)  ??? ? ? ?Zoster Vaccine due??08/09/21??Unknown Frequency  ??? ??Due In Future?  ??? ? ? ?Obesity Screening not due until??01/01/22??and every 1??year(s)  ??? ? ? ?Alcohol Misuse Screening not due until??01/02/22??and every 1??year(s)  ??? ? ? ?Depression Screening not due until??01/11/22??and every 1??year(s)  ???Satisfied?(in the past 1 year)  ??? ??Satisfied?  ??? ? ? ?Alcohol Misuse Screening on??08/09/21.??Satisfied by Sandy Pantoja  ??? ? ? ?Blood Pressure Screening on??08/09/21.??Satisfied by Sandy Pantoja  ??? ? ? ?Body Mass Index Check on??08/09/21.??Satisfied by Sandy Pantoja  ??? ? ? ?Depression Screening on??01/11/21.??Satisfied by Adelaide Shetty  ??? ? ? ?Diabetes Screening on??07/06/21.??Satisfied by Harsh Villaseñor  ??? ? ? ?Influenza Vaccine on??01/11/21.??Satisfied by Adelaide Shetty  ??? ? ? ?Lipid Screening on??07/06/21.??Satisfied by Heike Aguilar  ??? ? ? ?Obesity Screening on??08/09/21.??Satisfied by Sandy Pantoja  ?  Diagnostic  Results  (12/06/2019 10:40 CST US [facility] Comprehensive Extremity)  IMPRESSION:?  1. Calcific tendinosis of the supraspinatus.  2. Suspicion of a partial-thickness partial width tear of the  subscapularis tendon at its attachment to the humerus.  3. Likely biceps tendinosis.  4. Arthritic changes in the AC joint. [1]  ?  Shoulder radiographs show normal bony alignment     [1]?US [facility] Comprehensive Extremity; Ruth De Los Santos MD, Mckinley ESPINOSA 12/06/2019 10:40 CST   anxiety

## 2024-01-08 DIAGNOSIS — R25.1 TREMOR: Primary | ICD-10-CM

## 2024-02-29 ENCOUNTER — OFFICE VISIT (OUTPATIENT)
Dept: NEUROLOGY | Facility: CLINIC | Age: 64
End: 2024-02-29
Payer: COMMERCIAL

## 2024-02-29 VITALS
SYSTOLIC BLOOD PRESSURE: 120 MMHG | HEIGHT: 68 IN | WEIGHT: 188 LBS | BODY MASS INDEX: 28.49 KG/M2 | DIASTOLIC BLOOD PRESSURE: 79 MMHG

## 2024-02-29 DIAGNOSIS — G20.A1 PARKINSON'S DISEASE, UNSPECIFIED WHETHER DYSKINESIA PRESENT, UNSPECIFIED WHETHER MANIFESTATIONS FLUCTUATE: Primary | ICD-10-CM

## 2024-02-29 DIAGNOSIS — R25.1 TREMOR: ICD-10-CM

## 2024-02-29 PROCEDURE — 3078F DIAST BP <80 MM HG: CPT | Mod: CPTII,S$GLB,, | Performed by: PSYCHIATRY & NEUROLOGY

## 2024-02-29 PROCEDURE — 1159F MED LIST DOCD IN RCRD: CPT | Mod: CPTII,S$GLB,, | Performed by: PSYCHIATRY & NEUROLOGY

## 2024-02-29 PROCEDURE — 99205 OFFICE O/P NEW HI 60 MIN: CPT | Mod: S$GLB,,, | Performed by: PSYCHIATRY & NEUROLOGY

## 2024-02-29 PROCEDURE — 99999 PR PBB SHADOW E&M-EST. PATIENT-LVL III: CPT | Mod: PBBFAC,,, | Performed by: PSYCHIATRY & NEUROLOGY

## 2024-02-29 PROCEDURE — 3074F SYST BP LT 130 MM HG: CPT | Mod: CPTII,S$GLB,, | Performed by: PSYCHIATRY & NEUROLOGY

## 2024-02-29 PROCEDURE — 3008F BODY MASS INDEX DOCD: CPT | Mod: CPTII,S$GLB,, | Performed by: PSYCHIATRY & NEUROLOGY

## 2024-02-29 RX ORDER — AMOXICILLIN 500 MG
CAPSULE ORAL DAILY
COMMUNITY

## 2024-02-29 NOTE — PROGRESS NOTES
Subjective     Patient ID: Sang Magallanes is a 63 y.o. male.    Chief Complaint: Tremors (NP referred by Yudelka Smith. Dx: tremor/Started 4 mnth ago, left hand, worsens when holding objects/Folds arm, shakes whole are)    HPI  4 mos of worsening LUEResting tremor  Retired   No falls/hallucinations/gait issues  No parasomnias  Daily e dorian  +constipation  Review of Systems  The remainder of the 14 system ROS is noncontributory or negative unless mentioned/reviewed above.       Objective     Physical Exam  LUE resting tremor/bradykinesia  ?RUE tremor as well  No rigidity  Mental Status: Alert and oriented x3. Language is fluent with good comprehension.    Cranial Nerve: Pupils are equal, round, and reactive to light. Visual fields are intact to confrontation. Normal fundi. Ocular movements are intact. Face is symmetric at rest and with activation with intact sensation throughout. Hearing intact to finger rub bilaterally. Muscles of tongue and palate activate symmetrically. No dysarthria. Strength is full in sternocleidomastoid and trapezius bilaterally.    Motor: Muscle bulk and tone are normal. Strength is 5/5 in all four extremities both proximally and distally. Intact fine motor movements bilaterally. There is no pronator drift or satelliting on arm roll.    Sensory: Sensation is intact to light touch, pinprick, vibration, and proprioception throughout. Romberg is negative.    Reflexes: 2+ and symmetric at the biceps, triceps, brachioradialis, patella, and Achilles bilaterally. Plantar response is flexor bilaterally.    Coordination: No dysmetria on finger-nose-finger or heel-knee-shin. Normal rapid alternating movements. Fast finger tapping with normal amplitude and speed.    Gait: Narrow based with normal stride length and good arm swing bilaterally. Able to walk on heels, toes, and in tandem.        Assessment and Plan     1. Parkinson's disease, unspecified whether dyskinesia present, unspecified  whether manifestations fluctuate  -     Ambulatory referral/consult to Nuclear Medicine; Future; Expected date: 03/07/2024    2. Tremor  -     Ambulatory referral/consult to Neurology  -     Ambulatory referral/consult to Nuclear Medicine; Future; Expected date: 03/07/2024        STUART scan   Elects to hold off on meds for now  Had extensive ?s , all of which were answered         Follow up in about 6 weeks (around 4/11/2024).

## 2024-03-18 ENCOUNTER — TELEPHONE (OUTPATIENT)
Dept: NEUROLOGY | Facility: CLINIC | Age: 64
End: 2024-03-18
Payer: COMMERCIAL

## 2024-03-18 NOTE — TELEPHONE ENCOUNTER
Pt contacted their office to inquire if any medication that he can take as pre-med for upcoming Reuben scan; he has a history of claustrophobia    (Would prefer if an Rx recs, would be sent from neuro)

## 2024-03-19 NOTE — TELEPHONE ENCOUNTER
"Rcabdias'd VM From "Radha" [facility] [place] (pt's PCP)  [facility]  [phone]    Msg: Rqsting another call to discuss sedative for Reuben scan.    Rtn call - Lmsg @ # provided  Advised I see Dr Jabari coleman'd rqst 03/18/2024 and declined to prescribe sedative for Reuben scan.  Further explained Nuclear scanner is not an MRI, is a large open device and MD declines to prescribe sedative.   "

## 2024-04-02 ENCOUNTER — TELEPHONE (OUTPATIENT)
Dept: NEUROLOGY | Facility: CLINIC | Age: 64
End: 2024-04-02
Payer: COMMERCIAL

## 2024-04-02 NOTE — TELEPHONE ENCOUNTER
----- Message from Yasmine Hastings MA sent at 4/2/2024 10:27 AM CDT -----  Regarding: Sooner Appointment  Patient would like to move up his appointment sooner than May 8th with Dr. Barboza.     Call back: [phone]

## 2024-04-03 ENCOUNTER — OFFICE VISIT (OUTPATIENT)
Dept: NEUROLOGY | Facility: CLINIC | Age: 64
End: 2024-04-03
Payer: COMMERCIAL

## 2024-04-03 VITALS
WEIGHT: 188 LBS | SYSTOLIC BLOOD PRESSURE: 120 MMHG | DIASTOLIC BLOOD PRESSURE: 78 MMHG | BODY MASS INDEX: 28.49 KG/M2 | HEIGHT: 68 IN

## 2024-04-03 DIAGNOSIS — G20.A1 PARKINSON'S DISEASE, UNSPECIFIED WHETHER DYSKINESIA PRESENT, UNSPECIFIED WHETHER MANIFESTATIONS FLUCTUATE: Primary | ICD-10-CM

## 2024-04-03 PROCEDURE — 3008F BODY MASS INDEX DOCD: CPT | Mod: CPTII,S$GLB,, | Performed by: PSYCHIATRY & NEUROLOGY

## 2024-04-03 PROCEDURE — 3078F DIAST BP <80 MM HG: CPT | Mod: CPTII,S$GLB,, | Performed by: PSYCHIATRY & NEUROLOGY

## 2024-04-03 PROCEDURE — 3074F SYST BP LT 130 MM HG: CPT | Mod: CPTII,S$GLB,, | Performed by: PSYCHIATRY & NEUROLOGY

## 2024-04-03 PROCEDURE — 1159F MED LIST DOCD IN RCRD: CPT | Mod: CPTII,S$GLB,, | Performed by: PSYCHIATRY & NEUROLOGY

## 2024-04-03 PROCEDURE — 99999 PR PBB SHADOW E&M-EST. PATIENT-LVL III: CPT | Mod: PBBFAC,,, | Performed by: PSYCHIATRY & NEUROLOGY

## 2024-04-03 PROCEDURE — 99214 OFFICE O/P EST MOD 30 MIN: CPT | Mod: S$GLB,,, | Performed by: PSYCHIATRY & NEUROLOGY

## 2024-04-03 RX ORDER — ERGOCALCIFEROL 1.25 MG/1
50000 CAPSULE ORAL
COMMUNITY
Start: 2024-03-14

## 2024-04-03 RX ORDER — CARBIDOPA AND LEVODOPA 25; 100 MG/1; MG/1
1 TABLET ORAL 2 TIMES DAILY
Qty: 60 TABLET | Refills: 11 | Status: SHIPPED | OUTPATIENT
Start: 2024-04-03 | End: 2025-04-03

## 2024-04-03 NOTE — PROGRESS NOTES
Subjective     Patient ID: Sang Magallanes is a 63 y.o. male.    Chief Complaint: Tremors    HPI  4 mos of worsening LUEResting tremor prior to presentation here  Retired   No falls/hallucinations/gait issues  No parasomnias  Daily etoh  Reports he has had evolution of tremor and it appears to be slightly worsened and he had some symptoms in his unaffected arm last month.  He underwent the STUART scan which was abnormal  +constipation  Review of Systems  The remainder of the 14 system ROS is noncontributory or negative unless mentioned/reviewed above.       Objective     Physical Exam  LUE resting tremor/bradykinesia  ?RUE tremor as well  No rigidity  Mental Status: Alert and oriented x3. Language is fluent with good comprehension.    Cranial Nerve: Pupils are equal, round, and reactive to light. Visual fields are intact to confrontation. Normal fundi. Ocular movements are intact. Face is symmetric at rest and with activation with intact sensation throughout. Hearing intact to finger rub bilaterally. Muscles of tongue and palate activate symmetrically. No dysarthria. Strength is full in sternocleidomastoid and trapezius bilaterally.    Motor: Muscle bulk and tone are normal. Strength is 5/5 in all four extremities both proximally and distally. Intact fine motor movements bilaterally. There is no pronator drift or satelliting on arm roll.    Sensory: Sensation is intact to light touch, pinprick, vibration, and proprioception throughout. Romberg is negative.    Reflexes: 2+ and symmetric at the biceps, triceps, brachioradialis, patella, and Achilles bilaterally. Plantar response is flexor bilaterally.    Coordination: No dysmetria on finger-nose-finger or heel-knee-shin. Normal rapid alternating movements. Fast finger tapping with normal amplitude and speed.    Gait: Narrow based with normal stride length and good arm swing bilaterally. Able to walk on heels, toes, and in tandem.        Assessment and Plan      1. Parkinson's disease, unspecified whether dyskinesia present, unspecified whether manifestations fluctuate    Other orders  -     carbidopa-levodopa  mg (SINEMET)  mg per tablet; Take 1 tablet by mouth 2 (two) times a day.  Dispense: 60 tablet; Refill: 11        STUART scan abnormal c/w parkinsons disease  Pt interested in starting medications at this time, will send sinemet  Had extensive ?s , all of which were answered         Follow up in about 6 months (around 10/3/2024).    I was present with the resident during the history and exam.    [x ] I discussed the case with the resident and agree with the findings and plan as documented in the resident's note.  [ ] I discussed the case with the resident and agree with the findings and plan as documented in the resident's note except:    Adam N Foreman, MD [facility]

## (undated) DEVICE — PEROXIDE HYDROGEN 3% 16OZ

## (undated) DEVICE — TOURNIQUET SB QC DP 18X4IN

## (undated) DEVICE — KIT SURGICAL TURNOVER

## (undated) DEVICE — SEE MEDLINE ITEM 157173

## (undated) DEVICE — GLOVE PROTEXIS HYDROGEL SZ8

## (undated) DEVICE — SPLINT FIBERGLASS PAD 3X15

## (undated) DEVICE — Device

## (undated) DEVICE — DRAPE U-DRAPE ADHESIVE 60X60IN

## (undated) DEVICE — DRESSING XEROFORM FOIL PK 1X8

## (undated) DEVICE — BANDAGE VELCLOSE ELAS 3INX5YD

## (undated) DEVICE — GLOVE PROTEXIS HYDROGEL SZ6

## (undated) DEVICE — BANDAGE ACE NON LATEX 3IN

## (undated) DEVICE — GAUZE SPONGE 4X4 12PLY

## (undated) DEVICE — APPLICATOR CHLORAPREP ORN 26ML

## (undated) DEVICE — GLOVE PROTEXIS LTX MICRO 8

## (undated) DEVICE — PAD CAST SPECIALIST STRL 4

## (undated) DEVICE — ELECTRODE PATIENT RETURN DISP

## (undated) DEVICE — BLADE SURG STAINLESS STEEL #15

## (undated) DEVICE — SYR 10CC LUER LOCK

## (undated) DEVICE — SUT ETHILON 3-0 PS2 18 BLK

## (undated) DEVICE — GAUZE SPONGE 4'X4 12 PLY

## (undated) DEVICE — GLOVE PROTEXIS BLUE LATEX 6.5

## (undated) DEVICE — GOWN SURGICAL XX LARGE X LONG

## (undated) DEVICE — NDL SAFETY 25G X 1.5 ECLIPSE

## (undated) DEVICE — DRAPE STERI U-SHAPED 47X51IN

## (undated) DEVICE — SOL NACL IRR 1000ML BTL